# Patient Record
Sex: MALE | Race: WHITE | Employment: FULL TIME | URBAN - NONMETROPOLITAN AREA
[De-identification: names, ages, dates, MRNs, and addresses within clinical notes are randomized per-mention and may not be internally consistent; named-entity substitution may affect disease eponyms.]

---

## 2019-08-15 ENCOUNTER — APPOINTMENT (OUTPATIENT)
Dept: OCCUPATIONAL MEDICINE | Facility: OTHER | Age: 49
End: 2019-08-15

## 2019-08-15 PROCEDURE — 99000 SPECIMEN HANDLING OFFICE-LAB: CPT

## 2020-05-18 ENCOUNTER — HOSPITAL ENCOUNTER (EMERGENCY)
Facility: HOSPITAL | Age: 50
Discharge: HOME | End: 2020-05-18
Attending: NURSE PRACTITIONER | Admitting: NURSE PRACTITIONER
Payer: OTHER MISCELLANEOUS

## 2020-05-18 VITALS
SYSTOLIC BLOOD PRESSURE: 158 MMHG | TEMPERATURE: 98.6 F | DIASTOLIC BLOOD PRESSURE: 116 MMHG | RESPIRATION RATE: 14 BRPM | OXYGEN SATURATION: 98 %

## 2020-05-18 DIAGNOSIS — S61.011A LACERATION OF RIGHT THUMB WITHOUT FOREIGN BODY WITHOUT DAMAGE TO NAIL, INITIAL ENCOUNTER: ICD-10-CM

## 2020-05-18 PROCEDURE — 90471 IMMUNIZATION ADMIN: CPT

## 2020-05-18 PROCEDURE — 12002 RPR S/N/AX/GEN/TRNK2.6-7.5CM: CPT | Performed by: NURSE PRACTITIONER

## 2020-05-18 PROCEDURE — 90715 TDAP VACCINE 7 YRS/> IM: CPT | Performed by: NURSE PRACTITIONER

## 2020-05-18 PROCEDURE — 25000128 H RX IP 250 OP 636: Performed by: NURSE PRACTITIONER

## 2020-05-18 PROCEDURE — 12002 RPR S/N/AX/GEN/TRNK2.6-7.5CM: CPT

## 2020-05-18 RX ORDER — CEPHALEXIN 500 MG/1
500 CAPSULE ORAL 4 TIMES DAILY
Qty: 20 CAPSULE | Refills: 0 | Status: SHIPPED | OUTPATIENT
Start: 2020-05-18 | End: 2020-06-02

## 2020-05-18 RX ADMIN — CLOSTRIDIUM TETANI TOXOID ANTIGEN (FORMALDEHYDE INACTIVATED), CORYNEBACTERIUM DIPHTHERIAE TOXOID ANTIGEN (FORMALDEHYDE INACTIVATED), BORDETELLA PERTUSSIS TOXOID ANTIGEN (GLUTARALDEHYDE INACTIVATED), BORDETELLA PERTUSSIS FILAMENTOUS HEMAGGLUTININ ANTIGEN (FORMALDEHYDE INACTIVATED), BORDETELLA PERTUSSIS PERTACTIN ANTIGEN, AND BORDETELLA PERTUSSIS FIMBRIAE 2/3 ANTIGEN 0.5 ML: 5; 2; 2.5; 5; 3; 5 INJECTION, SUSPENSION INTRAMUSCULAR at 09:48

## 2020-05-18 ASSESSMENT — ENCOUNTER SYMPTOMS
NAUSEA: 0
RESPIRATORY NEGATIVE: 1
WOUND: 1
ACTIVITY CHANGE: 1
VOMITING: 0
NEUROLOGICAL NEGATIVE: 1
FEVER: 0
CHILLS: 0

## 2020-05-18 NOTE — ED AVS SNAPSHOT
[facility]  [address] [address]  Phone:  [phone]                                    Alfa Hart   MRN: [id number]    Department:  [facility]   Date of Visit:  5/18/2020           After Visit Summary Signature Page    I have received my discharge instructions, and my questions have been answered. I have discussed any challenges I see with this plan with the nurse or doctor.    ..........................................................................................................................................  Patient/Patient Representative Signature      ..........................................................................................................................................  Patient Representative Print Name and Relationship to Patient    ..................................................               ................................................  Date                                   Time    ..........................................................................................................................................  Reviewed by Signature/Title    ...................................................              ..............................................  Date                                               Time          22EPIC Rev 08/18

## 2020-05-18 NOTE — ED PROVIDER NOTES
"  History     Chief Complaint   Patient presents with     Laceration     Right thumb laceration. Was using a  at work this am when he accidentally sliced his thumb. Able to move his thumb. \"It just feels weird.\" Bleeding controlled with pressure.      HPI  Alfa Hart is a 50 year old male who presents with a right thumb laceration from a . This occurred today at his place of employment. He states it is difficult to bend his finger and a small amount of numbness noted. Denies previous injury to right thumb. States needs tetanus updated. Denies fevers, chills, nausea, and vomiting.    Allergies:  No Known Allergies    Problem List:    There are no active problems to display for this patient.       Past Medical History:    History reviewed. No pertinent past medical history.    Past Surgical History:    History reviewed. No pertinent surgical history.    Family History:    No family history on file.    Social History:  Marital Status:  Single [1]  Social History     Tobacco Use     Smoking status: None   Substance Use Topics     Alcohol use: None     Drug use: None        Medications:    cephALEXin (KEFLEX) 500 MG capsule          Review of Systems   Constitutional: Positive for activity change. Negative for chills and fever.   Respiratory: Negative.    Gastrointestinal: Negative for nausea and vomiting.   Skin: Positive for wound.   Neurological: Negative.        Physical Exam   BP: (!) 158/116  Heart Rate: 98  Temp: 98.6  F (37  C)  Resp: 14  SpO2: 98 %      Physical Exam  Vitals signs and nursing note reviewed.   Constitutional:       General: He is in acute distress.      Appearance: He is normal weight.   Cardiovascular:      Rate and Rhythm: Normal rate.   Pulmonary:      Effort: Pulmonary effort is normal.   Musculoskeletal:         General: Tenderness and signs of injury present.      Right hand: He exhibits decreased range of motion and laceration. He exhibits normal capillary refill. " Decreased strength noted. He exhibits thumb/finger opposition. He exhibits no finger abduction.        Hands:       Comments: Able to bend right thumb before laceration repair.   Skin:     General: Skin is warm and dry.      Capillary Refill: Capillary refill takes less than 2 seconds.   Neurological:      Mental Status: He is alert and oriented to person, place, and time.   Psychiatric:         Behavior: Behavior normal.         ED Course        [facility]    -Laceration Repair    Date/Time: 5/18/2020 4:53 PM  Performed by: Faustina Marquez CNP  Authorized by: Faustina Marquez CNP     UNIVERSAL PROTOCOL   Site Marked: NA  Prior Images Obtained and Reviewed:  NA  Required items: Required blood products, implants, devices and special equipment available    Patient identity confirmed:  Verbally with patient  NA - No sedation, light sedation, or local anesthesia  Confirmation Checklist:  Patient's identity using two indicators  Time out: Immediately prior to the procedure a time out was called (n/a)    Universal Protocol: the Joint Commission Universal Protocol was followed    Preparation: Patient was prepped and draped in usual sterile fashion    ESBL (mL):  10        ANESTHESIA (see MAR for exact dosages):     Anesthesia method:  Local infiltration    Local anesthetic:  Lidocaine 2% w/o epi  LACERATION DETAILS     Location:  Finger    Finger location:  R thumb    Length (cm):  3.5    Depth (mm):  4    REPAIR TYPE:     Repair type:  Simple      EXPLORATION:     Hemostasis achieved with:  Direct pressure    Wound exploration: wound explored through full range of motion and entire depth of wound probed and visualized      Wound extent: no nerve damage and no tendon damage      Contaminated: no      TREATMENT:     Area cleansed with:  Hibiclens and saline    Amount of cleaning:  Extensive    Irrigation solution:  Sterile saline    Irrigation volume:  20    Irrigation method:  Syringe    Visualized  foreign bodies/material removed: no      SKIN REPAIR     Repair method:  Sutures    Suture size:  4-0    Suture material:  Nylon    Number of sutures:  10    APPROXIMATION     Approximation:  Close    POST-PROCEDURE DETAILS     Dressing:  Antibiotic ointment, non-adherent dressing and splint for protection      PROCEDURE   Patient Tolerance:  Patient tolerated the procedure well with no immediate complications  Describe Procedure: Wound soaked in Hibiclens and injected with 6 ml of 2% lidocaine without epi. Wound irrigated with 20 ml normal saline. Sterile technique used to place Ten, 4.0 nylon sutures to bring wound together. Triple antibiotic, dressing, and splint applied. CMS intact before and after laceration repair.               No results found for this or any previous visit (from the past 24 hour(s)).    Medications   Tdap (tetanus-diphtheria-acell pertussis) (ADACEL) injection 0.5 mL (0.5 mLs Intramuscular Given 5/18/20 0936)       Assessments & Plan (with Medical Decision Making)     I have reviewed the nursing notes.    I have reviewed the findings, diagnosis, plan and need for follow up with the patient.  (S60.749R) Laceration of right thumb without foreign body without damage to nail, initial encounter  Comment:50 year old male who presents with a right thumb laceration from a . This occurred today at his place of employment. He states it is difficult to bend his finger and a small amount of numbness noted. Denies previous injury to right thumb. States needs tetanus updated. Denies fevers, chills, nausea, and vomiting.    Assessment:  3.5  X 4 mm x 4 mm linear laceration horizontally placed over MIP joint. Full thickness of dermis. Able to bend thumb at joint and can feel sensation at finger tip. Radial pulse 3+. Could bend thumb at MIP  Joint before and after laceration repair. Neurovascular status intact.    See procedure note.    Plan: cephalexin qid for five days. Education provided on  this/these medication and for lacerations.   Apply bacitracin and do daily dressing changes for the next 48 hours. You may shower but do not saturate the wound. If you have increased pain, redness at wound site, fevers, or abnormal drainage (purulent/pus) you need to see your primary care provider or return to Urgent Care/ER immediately. Complete all antibiotics even if feeling better.  Take antibiotics with food unless instructed otherwise. Yogurt or probiotics may help decrease stomach upset and diarrhea.  Suture removal in seven to ten days.   Keep affected extremity elevated as much as possible for next 24 - 48 hours. Ice to affected area 20 minutes every hour as needed for comfort. After 48 hours you can apply heat. Ibuprofen 600 to 800 mg (3 - 4 tabs of over the counter med) every six to eight hours as needed;not to exceed maximum amount of 3200 mg in 24 hours.Tylenol 650 to 1000 mg every four to six hours as needed (not to exceed more than 4000 mg in a 24 hour period). May use interchangeably. Suggest medicating around the clock for the next 24-48 hours. Use finger splint  until you can move your finger without pain and for the next 3 days. Slowly start to wiggle your thumb and move hand as often as possible but not beyond the point of pain. Follow up with primary provider  as needed  These discharge instructions and medications were reviewed with him and understanding verbalized.    Discharge Medication List as of 5/18/2020 10:30 AM      START taking these medications    Details   cephALEXin (KEFLEX) 500 MG capsule Take 1 capsule (500 mg) by mouth 4 times daily for 5 days, Disp-20 capsule,R-0, E-Prescribe             Final diagnoses:   Laceration of right thumb without foreign body without damage to nail, initial encounter       5/18/2020   [facility]       Faustina Marquez, CNP  05/18/20 1244

## 2020-05-18 NOTE — DISCHARGE INSTRUCTIONS
Apply bacitracin and do daily dressing changes for the next 48 hours. You may shower but do not saturate the wound. If you have increased pain, redness at wound site, fevers, or abnormal drainage (purulent/pus) you need to see your primary care provider or return to Urgent Care/ER immediately. Acetaminophen/tylenol  or ibuprofen for pain. Complete all antibiotics even if feeling better.  Take antibiotics with food unless instructed otherwise. Yogurt or probiotics may help decrease stomach upset and diarrhea.  Suture removal in seven to ten days.   Keep affected extremity elevated as much as possible for next 24 - 48 hours. Ice to affected area 20 minutes every hour as needed for comfort. After 48 hours you can apply heat. Ibuprofen 600 to 800 mg (3 - 4 tabs of over the counter med) every six to eight hours as needed;not to exceed maximum amount of 3200 mg in 24 hours.Tylenol 650 to 1000 mg every four to six hours as needed (not to exceed more than 4000 mg in a 24 hour period). May use interchangeably. Suggest medicating around the clock for the next 24-48 hours. Use finger splint  until you can move your finger without pain and for the next 3 days. Slowly start to wiggle your thumb and move hand as often as possible but not beyond the point of pain. Follow up with primary provider  as needed

## 2020-05-18 NOTE — ED TRIAGE NOTES
Right hand thumb lac, cut opening boxes with a . Onset today. Pt notes it been awhile since last tdap, none on file. Pt notes he needs an update.

## 2020-06-02 ENCOUNTER — HOSPITAL ENCOUNTER (EMERGENCY)
Facility: HOSPITAL | Age: 50
Discharge: HOME | End: 2020-06-02
Attending: NURSE PRACTITIONER | Admitting: NURSE PRACTITIONER
Payer: OTHER MISCELLANEOUS

## 2020-06-02 VITALS
DIASTOLIC BLOOD PRESSURE: 98 MMHG | OXYGEN SATURATION: 98 % | TEMPERATURE: 98.8 F | RESPIRATION RATE: 16 BRPM | SYSTOLIC BLOOD PRESSURE: 144 MMHG

## 2020-06-02 DIAGNOSIS — Z48.02 ENCOUNTER FOR REMOVAL OF SUTURES: ICD-10-CM

## 2020-06-02 DIAGNOSIS — S61.011A LACERATION OF RIGHT THUMB: ICD-10-CM

## 2020-06-02 DIAGNOSIS — S66.901A: Primary | ICD-10-CM

## 2020-06-02 PROCEDURE — 99212 OFFICE O/P EST SF 10 MIN: CPT | Mod: Z6 | Performed by: NURSE PRACTITIONER

## 2020-06-02 PROCEDURE — G0463 HOSPITAL OUTPT CLINIC VISIT: HCPCS

## 2020-06-02 ASSESSMENT — ENCOUNTER SYMPTOMS: WOUND: 1

## 2020-06-02 NOTE — ED PROVIDER NOTES
"  History     Chief Complaint   Patient presents with     Suture Removal     right thumb. also states that thumb isn't \"working right.\" denies cutting tendon      HPI  Alfa Hart is a 50 year old male who presents ambulatory to urgent care for removal of sutures to right thumb.  Patient was seen on 5/18/2020 for a laceration of his right thumb whilst he was at work.  At that time the patient did state he was having trouble moving his thumb and had some numbness.  Laceration repair was done and patient was found to be able to move his thumb both before and after sutures were placed.  Today, sutures were removed without difficulty.  However, reported inability to extend thumb actively along with complaints of some numbness to the tip of his thumb.  He denies any pain.    Allergies:  No Known Allergies    Problem List:    There are no active problems to display for this patient.       Past Medical History:    No past medical history on file.    Past Surgical History:    No past surgical history on file.    Family History:    No family history on file.    Social History:  Marital Status:  Single [1]  Social History     Tobacco Use     Smoking status: Not on file   Substance Use Topics     Alcohol use: Not on file     Drug use: Not on file        Medications:    No current outpatient medications on file.        Review of Systems   Skin: Positive for wound.   All other systems reviewed and are negative.      Physical Exam   BP: 144/98  Heart Rate: 90  Temp: 98.8  F (37.1  C)  Resp: 16  SpO2: 98 %      Physical Exam  Vitals signs and nursing note reviewed.   Constitutional:       Appearance: Normal appearance. He is not ill-appearing or toxic-appearing.   Eyes:      Pupils: Pupils are equal, round, and reactive to light.   Cardiovascular:      Rate and Rhythm: Normal rate.   Pulmonary:      Effort: Pulmonary effort is normal.   Musculoskeletal:      Right hand: He exhibits no tenderness, normal capillary refill and no " swelling.      Comments: Healed laceration to right thumb near the interphalangeal joint. Patient is able to flex and extend thumb at MCP joint. Unable to extend thumb actively at IP joint. Can distinguish between sharp and soft objects to entire thumb. Decreased strength of thumb with extension.    Skin:     General: Skin is warm and dry.      Capillary Refill: Capillary refill takes less than 2 seconds.   Neurological:      Mental Status: He is alert and oriented to person, place, and time.         ED Course        Procedures               No results found for this or any previous visit (from the past 24 hour(s)).    Medications - No data to display    Assessments & Plan (with Medical Decision Making)   Injury of extensor tendon of right hand:  Patient seen here on 5/18/2020 for a laceration to his right thumb and sutures applied.  During this visit patient was noted to have full range of motion and sensation to right thumb.  Patient presented to urgent care today to get sutures removed and reported inability to actively extend right thumb along with some numbness.  Patient can extend her right thumb with assistance of another finger.  Able to flex thumb without any assistance.  Sensation intact.  Radial pulse 2+.  Laceration has healed up well with no erythema or concerning for infection.  Referral placed to [facility] per patient's choice.  Recommended patient continue using splint.  Return to emergency department worsening concerning symptoms.  Patient verbalized understanding.    I have reviewed the nursing notes.    I have reviewed the findings, diagnosis, plan and need for follow up with the patient.      There are no discharge medications for this patient.      Final diagnoses:   Encounter for removal of sutures   Laceration of right thumb   Injury of extensor tendon of right hand       6/2/2020   [facility]     Kimberly Durant, CNP  06/02/20 2031

## 2020-06-02 NOTE — ED NOTES
Patient discharged with understanding of instructions and recommendations.   Denies any questions or concerns.     Candy Lopez LPN on 6/2/2020 at 12:23 PM

## 2020-06-02 NOTE — DISCHARGE INSTRUCTIONS
Continue using splint to affected thumb.    Schedule a follow-up appointment with [facility] for further evaluation.    Return to emergency department for worsening or concerning symptoms.

## 2020-06-02 NOTE — ED NOTES
Suture removal:     Date sutures applied: 5/18/20         Where (setting) in which they applied:Urgent Care visit    Description:  Type: sutures  Location: Right thumb    History:    Cause of laceration: cut on     Accompanying Signs & Symptoms: (staff: if yes-describe)  Redness: no  Warmth: no  Drainage: no  Still bleeding: no  Fevers: no    Last tetanus shot: 5/18/20.     Alfa Hart presents to the clinic today for removal of sutures.  The patient has had the sutures in place for 14 days.  There has been no history of infection or drainage.  10 sutures are seen located on the right thumb.  The wound is healing well with no signs of infection.  Tetanus status is up to date.   All sutures were easily removed today.  Routine wound care discussed.  The patient will follow up as needed.    Candy Lopez LPN on 6/2/2020 at 12:01 PM

## 2020-06-02 NOTE — ED AVS SNAPSHOT
[facility]  [address] [address]  Phone:  [phone]                                    Alfa Hart   MRN: [id number]    Department:  [facility]   Date of Visit:  6/2/2020           After Visit Summary Signature Page    I have received my discharge instructions, and my questions have been answered. I have discussed any challenges I see with this plan with the nurse or doctor.    ..........................................................................................................................................  Patient/Patient Representative Signature      ..........................................................................................................................................  Patient Representative Print Name and Relationship to Patient    ..................................................               ................................................  Date                                   Time    ..........................................................................................................................................  Reviewed by Signature/Title    ...................................................              ..............................................  Date                                               Time          22EPIC Rev 08/18

## 2020-06-02 NOTE — ED TRIAGE NOTES
Patient presents today with request of suture removal and then wound check.   Seen 5/18/20 for laceration to right thumb (cut with )   Received 10 sutures.   0/10 currently.   Is having difficulty moving thumb / extending thumb / minor numbness   Denies cutting tendon  Would like to talk to provider and discuss how to prevent reopening wound once sutures are removed.   Received tdap 5/18/20

## 2020-11-09 ENCOUNTER — APPOINTMENT (OUTPATIENT)
Dept: OCCUPATIONAL MEDICINE | Facility: OTHER | Age: 50
End: 2020-11-09

## 2020-11-09 PROCEDURE — 99000 SPECIMEN HANDLING OFFICE-LAB: CPT
